# Patient Record
Sex: MALE | Race: BLACK OR AFRICAN AMERICAN | NOT HISPANIC OR LATINO | Employment: FULL TIME | URBAN - METROPOLITAN AREA
[De-identification: names, ages, dates, MRNs, and addresses within clinical notes are randomized per-mention and may not be internally consistent; named-entity substitution may affect disease eponyms.]

---

## 2017-08-17 ENCOUNTER — HOSPITAL ENCOUNTER (EMERGENCY)
Facility: OTHER | Age: 27
Discharge: HOME | End: 2017-08-17
Attending: INTERNAL MEDICINE
Payer: MEDICAID

## 2017-08-17 VITALS
DIASTOLIC BLOOD PRESSURE: 73 MMHG | TEMPERATURE: 99 F | BODY MASS INDEX: 28.04 KG/M2 | RESPIRATION RATE: 18 BRPM | HEIGHT: 68 IN | OXYGEN SATURATION: 100 % | WEIGHT: 185 LBS | SYSTOLIC BLOOD PRESSURE: 119 MMHG | HEART RATE: 53 BPM

## 2017-08-17 DIAGNOSIS — M54.50 ACUTE LEFT-SIDED LOW BACK PAIN WITHOUT SCIATICA: Primary | ICD-10-CM

## 2017-08-17 LAB
BILIRUBIN, POC UA: NEGATIVE
BLOOD, POC UA: NEGATIVE
CLARITY, POC UA: CLEAR
COLOR, POC UA: ABNORMAL
GLUCOSE, POC UA: NEGATIVE
KETONES, POC UA: NEGATIVE
LEUKOCYTE EST, POC UA: NEGATIVE
NITRITE, POC UA: NEGATIVE
PH UR STRIP: 6.5 [PH]
PROTEIN, POC UA: NEGATIVE
SPECIFIC GRAVITY, POC UA: 1.02
UROBILINOGEN, POC UA: 1 E.U./DL

## 2017-08-17 PROCEDURE — 81003 URINALYSIS AUTO W/O SCOPE: CPT

## 2017-08-17 PROCEDURE — 99283 EMERGENCY DEPT VISIT LOW MDM: CPT

## 2017-08-17 RX ORDER — METHOCARBAMOL 750 MG/1
1500 TABLET, FILM COATED ORAL 3 TIMES DAILY
Qty: 30 TABLET | Refills: 0 | Status: SHIPPED | OUTPATIENT
Start: 2017-08-17 | End: 2017-08-22

## 2017-08-17 RX ORDER — IBUPROFEN 800 MG/1
800 TABLET ORAL EVERY 8 HOURS PRN
Qty: 30 TABLET | Refills: 0 | Status: SHIPPED | OUTPATIENT
Start: 2017-08-17

## 2017-08-22 NOTE — ED PROVIDER NOTES
"Encounter Date: 8/17/2017       History     Chief Complaint   Patient presents with    Flank Pain     pt c/o L flank pain x " a few months" intermittedly, pt denies difficuly urinating      26 y/o male presents to ED with left flank pain x several days; no urinary symptoms      The history is provided by the patient. No  was used.   Back Pain    This is a new problem. The current episode started several days ago. The problem occurs intermittently. The problem has been unchanged. The pain is associated with no known injury. The pain is present in the lumbar spine. The quality of the pain is described as aching. The pain does not radiate. The pain is at a severity of 3/10. The symptoms are aggravated by bending and twisting. The pain is the same all the time. Pertinent negatives include no fever.     Review of patient's allergies indicates:  No Known Allergies  Past Medical History:   Diagnosis Date    Asthma      History reviewed. No pertinent surgical history.  No family history on file.  Social History   Substance Use Topics    Smoking status: Never Smoker    Smokeless tobacco: Never Used    Alcohol use Yes     Review of Systems   Constitutional: Negative for fever.   Musculoskeletal: Positive for back pain.   All other systems reviewed and are negative.      Physical Exam     Initial Vitals   BP Pulse Resp Temp SpO2   08/17/17 2013 08/17/17 2013 08/17/17 2013 08/17/17 2014 08/17/17 2013   (!) 156/85 (!) 58 18 99 °F (37.2 °C) 98 %      MAP       08/17/17 2013       108.67         Physical Exam    Nursing note and vitals reviewed.  Constitutional: He appears well-developed and well-nourished.   HENT:   Head: Normocephalic and atraumatic.   Eyes: EOM are normal.   Neck: Normal range of motion.   Cardiovascular: Normal rate and regular rhythm.   Pulmonary/Chest: Breath sounds normal. He is in respiratory distress.   Abdominal: Bowel sounds are normal. He exhibits no distension. There is no " tenderness.   Musculoskeletal:   Left flank pain upon movement; no CVA tenderness   Neurological: He is alert.   Skin: Skin is warm and dry.   Psychiatric: He has a normal mood and affect.         ED Course   Procedures  Labs Reviewed   POCT URINALYSIS W/O SCOPE - Abnormal; Notable for the following:        Result Value    Glucose, UA Negative (*)     Bilirubin, UA Negative (*)     Ketones, UA Negative (*)     Blood, UA Negative (*)     Protein, UA Negative (*)     Nitrite, UA Negative (*)     Leukocytes, UA Negative (*)     All other components within normal limits   POCT URINALYSIS W/O SCOPE             Medical Decision Making:   Initial Assessment:   28 y/o male presents to ED with left flank pain x several days; no urinary symptoms  Differential Diagnosis:   Back strain  Pyelonephritis    ED Management:  U/A wnl; pt given instructions for back pain and Rx's for ibuprofen and robaxin given and pt advised to f/u with PCP tomorrow for reevaluation                   ED Course     Clinical Impression:   The encounter diagnosis was Acute left-sided low back pain without sciatica.    Disposition:   Disposition: Discharged  Condition: Stable                        Herrera Jamison MD  08/22/17 6550

## 2018-05-23 ENCOUNTER — HOSPITAL ENCOUNTER (EMERGENCY)
Facility: HOSPITAL | Age: 28
Discharge: HOME | End: 2018-05-23
Attending: EMERGENCY MEDICINE
Payer: MEDICAID

## 2018-05-23 VITALS
DIASTOLIC BLOOD PRESSURE: 75 MMHG | WEIGHT: 205 LBS | RESPIRATION RATE: 15 BRPM | BODY MASS INDEX: 28.7 KG/M2 | HEIGHT: 71 IN | HEART RATE: 52 BPM | TEMPERATURE: 98 F | OXYGEN SATURATION: 99 % | SYSTOLIC BLOOD PRESSURE: 113 MMHG

## 2018-05-23 DIAGNOSIS — R07.9 RECURRENT CHEST PAIN: Primary | ICD-10-CM

## 2018-05-23 DIAGNOSIS — R07.9 CHEST PAIN: ICD-10-CM

## 2018-05-23 PROCEDURE — 93010 ELECTROCARDIOGRAM REPORT: CPT | Mod: ,,, | Performed by: INTERNAL MEDICINE

## 2018-05-23 PROCEDURE — 99284 EMERGENCY DEPT VISIT MOD MDM: CPT

## 2018-05-23 NOTE — ED PROVIDER NOTES
"Encounter Date: 5/23/2018       History     Chief Complaint   Patient presents with    Chest Pain     Pt reports of "stabbing" left sided CP x months without radiation. Pt denies SOB, N/V      28-year-old male with no past medical history presents to emergency department for 7 months history of intermittent, sharp, left-sided chest pain that is nonradiating.  Patient reports the last episode began around 9:00 p.m. last night while he was sitting at his computer doing research".  Symptoms lasted for approximately 2 min per patient after resolving spontaneously.  Currently asymptomatic.  He is here tonight due to the chronicity of symptoms and wants to be "checked out". Denies associated shortness of breath, fevers, nausea, and abdominal pain. No medications taken prior to arrival.  Denies calf pain and history of DVT/PE.  Nonsmoker.  No family history of heart disease.  There recent long-distance travel.          Review of patient's allergies indicates:  No Known Allergies  Past Medical History:   Diagnosis Date    Asthma      No past surgical history on file.  No family history on file.  Social History   Substance Use Topics    Smoking status: Never Smoker    Smokeless tobacco: Never Used    Alcohol use Yes     Review of Systems   Constitutional: Negative for chills and fever.   Eyes: Negative for visual disturbance.   Respiratory: Negative for cough and shortness of breath.    Cardiovascular: Positive for chest pain (resolved).   Gastrointestinal: Negative for abdominal pain, diarrhea, nausea and vomiting.   Genitourinary: Negative for dysuria, flank pain, frequency, hematuria and urgency.   Musculoskeletal: Negative for back pain and neck pain.   Skin: Negative for pallor, rash and wound.   Neurological: Negative for dizziness, seizures, syncope, light-headedness and headaches.       Physical Exam     Initial Vitals [05/23/18 0302]   BP Pulse Resp Temp SpO2   (!) 151/97 (!) 50 16 97.7 °F (36.5 °C) 100 %    "   MAP       115         Physical Exam    Nursing note and vitals reviewed.  Constitutional: He appears well-developed and well-nourished. He is not diaphoretic. No distress.   HENT:   Head: Normocephalic and atraumatic.   Nose: Nose normal.   Eyes: Conjunctivae and EOM are normal. Right eye exhibits no discharge. Left eye exhibits no discharge.   Neck: Normal range of motion. No tracheal deviation present. No JVD present.   Cardiovascular: Normal rate, regular rhythm and normal heart sounds. Exam reveals no friction rub.    No murmur heard.  Pulmonary/Chest: Breath sounds normal. No stridor. No respiratory distress. He has no wheezes. He has no rhonchi. He has no rales. He exhibits no tenderness.   Abdominal: Soft. He exhibits no distension. There is no tenderness. There is no guarding.   Musculoskeletal: Normal range of motion.   No chest wall tenderness   Neurological: He is alert and oriented to person, place, and time.   Skin: Skin is warm and dry. No rash and no abscess noted. No erythema. No pallor.         ED Course   Procedures  Labs Reviewed - No data to display  EKG Readings: (Independently Interpreted)   Initial Reading: No STEMI. Rhythm: Sinus Bradycardia. Heart Rate: 49. Axis: Normal.       X-Rays:   Independently Interpreted Readings:   Chest X-Ray: No acute abnormalities.     Medical Decision Making:   History:   Old Medical Records: I decided to obtain old medical records.      This is an emergent evaluation of a 28 y.o. male with no PMHx and no cardiac risk factors, presenting to the ED for non-radiating chest pain. Currently asymptomatic. Denies traumatic injury, nausea, vomiting, fever, cough, SOB, and symptoms worse with food. Vitals WNL, afebrile. Patient is non-toxic appearing, not diaphoretic, and in no acute distress. Lungs CTAB.     EKG shows no STEMI.    I am unsure of the etiology of this patient's chest pain, but do not believe it is of emergent etiology. No evidence of aortic dissection,  PTX, or PNA on CXR. No convincing evidence for ACS. PERC (-).     Discharged home. Instructed to follow up with PCP for reevaluation and management of symptoms. Issued an educational handout on chest pain.      I discussed with the patient the diagnosis, treatment plan, indications for return to the emergency department, and for expected follow-up. The patient verbalized an understanding. The patient is asked if there are any questions or concerns. We discuss the case, until all issues are addressed to the patients satisfaction. Patient understands and is agreeable to the plan.     I discussed this patient with Dr. Ceron who is in agreement with my assessment and plan.                     Clinical Impression:   The primary encounter diagnosis was Recurrent chest pain. A diagnosis of Chest pain was also pertinent to this visit.                           Noman Lyons PA-C  05/23/18 05

## 2018-05-30 DIAGNOSIS — R07.9 CHEST PAIN: Primary | ICD-10-CM

## 2018-06-04 ENCOUNTER — HOSPITAL ENCOUNTER (OUTPATIENT)
Dept: CARDIOLOGY | Facility: HOSPITAL | Age: 28
Discharge: HOME | End: 2018-06-04
Attending: INTERNAL MEDICINE
Payer: COMMERCIAL

## 2018-06-04 DIAGNOSIS — R07.9 CHEST PAIN: ICD-10-CM

## 2018-06-04 LAB — DIASTOLIC DYSFUNCTION: NO

## 2018-06-04 PROCEDURE — 93017 CV STRESS TEST TRACING ONLY: CPT

## 2018-06-04 PROCEDURE — 93016 CV STRESS TEST SUPVJ ONLY: CPT | Mod: ,,, | Performed by: INTERNAL MEDICINE

## 2018-06-04 PROCEDURE — 93018 CV STRESS TEST I&R ONLY: CPT | Mod: ,,, | Performed by: INTERNAL MEDICINE

## 2022-06-16 ENCOUNTER — CLINICAL SUPPORT (OUTPATIENT)
Dept: URGENT CARE | Facility: CLINIC | Age: 32
End: 2022-06-16
Payer: COMMERCIAL

## 2022-06-16 DIAGNOSIS — Z20.822 ENCOUNTER FOR LABORATORY TESTING FOR COVID-19 VIRUS: Primary | ICD-10-CM

## 2024-01-07 ENCOUNTER — HOSPITAL ENCOUNTER (EMERGENCY)
Facility: HOSPITAL | Age: 34
Discharge: HOME | End: 2024-01-07
Attending: EMERGENCY MEDICINE
Payer: MEDICAID

## 2024-01-07 VITALS
BODY MASS INDEX: 30.35 KG/M2 | DIASTOLIC BLOOD PRESSURE: 90 MMHG | RESPIRATION RATE: 16 BRPM | TEMPERATURE: 98 F | SYSTOLIC BLOOD PRESSURE: 132 MMHG | OXYGEN SATURATION: 100 % | HEIGHT: 70 IN | HEART RATE: 53 BPM | WEIGHT: 212 LBS

## 2024-01-07 DIAGNOSIS — M54.2 NECK PAIN: Primary | ICD-10-CM

## 2024-01-07 PROBLEM — J45.20 MILD INTERMITTENT ASTHMA WITHOUT COMPLICATION: Status: ACTIVE | Noted: 2022-07-01

## 2024-01-07 PROCEDURE — 99284 EMERGENCY DEPT VISIT MOD MDM: CPT

## 2024-01-07 RX ORDER — METHOCARBAMOL 750 MG/1
1500 TABLET, FILM COATED ORAL 3 TIMES DAILY PRN
Qty: 20 TABLET | Refills: 0 | Status: SHIPPED | OUTPATIENT
Start: 2024-01-07 | End: 2024-01-12

## 2024-01-07 RX ORDER — KETOROLAC TROMETHAMINE 10 MG/1
10 TABLET, FILM COATED ORAL 4 TIMES DAILY PRN
Qty: 20 TABLET | Refills: 0 | Status: SHIPPED | OUTPATIENT
Start: 2024-01-07 | End: 2024-01-12

## 2024-01-07 RX ORDER — PANTOPRAZOLE SODIUM 20 MG/1
20 TABLET, DELAYED RELEASE ORAL DAILY
Qty: 10 TABLET | Refills: 0 | Status: SHIPPED | OUTPATIENT
Start: 2024-01-07 | End: 2024-01-17

## 2024-01-07 NOTE — Clinical Note
"Daxa Reid" Luis Eduardo was seen and treated in our emergency department on 1/7/2024.  He may return to work on 01/10/2024.       If you have any questions or concerns, please don't hesitate to call.      Jorge Truong, CHELITA"

## 2024-01-08 NOTE — ED PROVIDER NOTES
Encounter Date: 1/7/2024       History     Chief Complaint   Patient presents with    Neck Pain     R side neck pain x 3 days. No fever, injury or trauma. Full ROM noted.      35yo M presents to ED with chief complaint atraumatic R sided neck pain x 7d.    Pt initially with pain to R sided posterolateral neck, has begun to radiate and involve his R upper back, his R deltoid region of his shoulder. No hx similar pain. No cough. No fever. No recent illness. No SOB or CP. No pleuritic pain. Pain worsened with ROM of his neck, with ROM of his R shoulder. No joint swelling. No rash. No joint redness or warmth. No wound or injury. No associated HA. No relief with Aspirin.     R hand dominant.     PMH:  Asthma      Review of patient's allergies indicates:  No Known Allergies  Past Medical History:   Diagnosis Date    Asthma      No past surgical history on file.  No family history on file.  Social History     Tobacco Use    Smoking status: Never    Smokeless tobacco: Never   Substance Use Topics    Alcohol use: Yes    Drug use: No     Review of Systems   Constitutional:  Negative for chills and fever.   Respiratory:  Negative for cough and shortness of breath.    Cardiovascular:  Negative for chest pain.   Musculoskeletal:  Positive for arthralgias, back pain and neck pain. Negative for neck stiffness.   Skin:  Negative for rash and wound.   Neurological:  Negative for syncope and headaches.   Hematological:  Negative for adenopathy.       Physical Exam     Initial Vitals [01/07/24 1809]   BP Pulse Resp Temp SpO2   134/82 (!) 54 16 97.4 °F (36.3 °C) 99 %      MAP       --         Physical Exam    Nursing note and vitals reviewed.  Constitutional: He appears well-developed and well-nourished. He is not diaphoretic. No distress.   HENT:   Head: Normocephalic and atraumatic.   Neck: Neck supple.   Normal range of motion.  Cardiovascular:  Intact distal pulses.           1+ radial bilaterally   Pulmonary/Chest: No respiratory  distress.   Musculoskeletal:         General: Normal range of motion.      Cervical back: Normal range of motion and neck supple.      Comments: No significant muscular tenderness to palpation to the cervical paraspinal musculature, to the trapezius, to the upper back, to the deltoid musculature.  There is pain to these regions with rotation of neck to the right, with abduction of right shoulder past 90° anteriorly.  No significant pain with passive range of motion of the right shoulder.No bony deformity.  No open wound.       Neurological: He is alert and oriented to person, place, and time.   Skin: Skin is warm. Capillary refill takes less than 2 seconds.   Psychiatric: He has a normal mood and affect. Thought content normal.         ED Course   Procedures  Labs Reviewed - No data to display       Imaging Results    None          Medications - No data to display  Medical Decision Making  Differential diagnosis:  Cervical strain, back strain, contusion, fracture, arthritis    Amount and/or Complexity of Data Reviewed  Discussion of management or test interpretation with external provider(s): Young and otherwise healthy with no significant comorbidities.  Reassuring vitals.  Atraumatic neck pain, eventually began to involve the upper back, right shoulder.  Painful range of motion of the right upper extremity, range of motion of the neck.  No nuchal rigidity.  No fever.  No associated headache.  There is no meningismus.  There is no muscular tenderness on exam.  No midline spinal tenderness.  Will trial muscle relaxers, NSAIDs, outpatient follow-up for any persistent symptoms.  Discussed interim return precautions.  Patient feels comfortable with current plan, with attempted outpatient treatment.     Risk  Prescription drug management.                                      Clinical Impression:  Final diagnoses:  [M54.2] Neck pain (Primary)          ED Disposition Condition    Discharge Stable          ED Prescriptions        Medication Sig Dispense Start Date End Date Auth. Provider    methocarbamoL (ROBAXIN) 750 MG Tab Take 2 tablets (1,500 mg total) by mouth 3 (three) times daily as needed (stiffness/soreness). 20 tablet 1/7/2024 1/12/2024 Jorge Truong PA-C    ketorolac (TORADOL) 10 mg tablet Take 1 tablet (10 mg total) by mouth 4 (four) times daily as needed for Pain. 20 tablet 1/7/2024 1/12/2024 Jorge Truong PA-C    pantoprazole (PROTONIX) 20 MG tablet Take 1 tablet (20 mg total) by mouth once daily. for 10 days 10 tablet 1/7/2024 1/17/2024 Jorge Truong PA-C          Follow-up Information       Follow up With Specialties Details Why Contact Info    Loly Baker FNP  Schedule an appointment as soon as possible for a visit  For reevaluation [address] [address]   [phone] (Work)   [phone] (Fax)    [facility] -  Schedule an appointment as soon as possible for a visit  To establish primary care physician, for reevaluation [address] [place] [address]  [phone]      [facility] Schedule an appointment as soon as possible for a visit  To establish primary care physician, For reevaluation [address] [facility] [place] [address]  [phone]               Jorge Truong PA-C  01/08/24 9035

## 2024-01-08 NOTE — DISCHARGE INSTRUCTIONS
Get some good rest. Ice, heating pad to the area may help with stiffness and soreness.    Toradol 3-4 times daily as needed for pain.  Remember to take Protonix once daily while taking the Toradol to prevent stomach injury.  Robaxin for continued stiffness/soreness. Be aware, this medication is sedating.  Do not mix with alcohol or any other sedating medications.  Do not drive or operate machinery when taking this medication.     Follow-up and establish care with a local primary care provider using information provided for re-evaluation should your symptoms persist.    Return to this ED if worsening pain, if you develop arm weakness, if you begin with severe headache, if unable to tolerate pain, if any other problems occur.

## 2024-01-08 NOTE — ED NOTES
Patient examined, evaluated, and educated on discharge instructions and prescriptions by SAVI Patel without nursing assistance. [facility]

## 2024-07-25 ENCOUNTER — HOSPITAL ENCOUNTER (EMERGENCY)
Facility: HOSPITAL | Age: 34
Discharge: HOME | End: 2024-07-25
Attending: EMERGENCY MEDICINE
Payer: COMMERCIAL

## 2024-07-25 VITALS
TEMPERATURE: 98 F | OXYGEN SATURATION: 99 % | WEIGHT: 210 LBS | DIASTOLIC BLOOD PRESSURE: 80 MMHG | HEART RATE: 73 BPM | HEIGHT: 70 IN | SYSTOLIC BLOOD PRESSURE: 131 MMHG | RESPIRATION RATE: 20 BRPM | BODY MASS INDEX: 30.06 KG/M2

## 2024-07-25 DIAGNOSIS — M79.673 FOOT PAIN: ICD-10-CM

## 2024-07-25 DIAGNOSIS — S90.30XA CONTUSION OF FOOT, UNSPECIFIED LATERALITY, INITIAL ENCOUNTER: Primary | ICD-10-CM

## 2024-07-25 PROCEDURE — 99284 EMERGENCY DEPT VISIT MOD MDM: CPT | Mod: 25

## 2024-07-25 PROCEDURE — 25000003 PHARM REV CODE 250

## 2024-07-25 RX ORDER — METHOCARBAMOL 500 MG/1
1000 TABLET, FILM COATED ORAL 3 TIMES DAILY
Qty: 30 TABLET | Refills: 0 | Status: SHIPPED | OUTPATIENT
Start: 2024-07-25 | End: 2024-07-30

## 2024-07-25 RX ORDER — KETOROLAC TROMETHAMINE 10 MG/1
10 TABLET, FILM COATED ORAL
Status: COMPLETED | OUTPATIENT
Start: 2024-07-25 | End: 2024-07-25

## 2024-07-25 RX ORDER — NAPROXEN 500 MG/1
500 TABLET ORAL 2 TIMES DAILY
Qty: 30 TABLET | Refills: 0 | Status: SHIPPED | OUTPATIENT
Start: 2024-07-25

## 2024-07-25 RX ORDER — METHOCARBAMOL 500 MG/1
1000 TABLET, FILM COATED ORAL 3 TIMES DAILY
Qty: 30 TABLET | Refills: 0 | Status: SHIPPED | OUTPATIENT
Start: 2024-07-25 | End: 2024-07-25

## 2024-07-25 RX ORDER — NAPROXEN 500 MG/1
500 TABLET ORAL 2 TIMES DAILY
Qty: 30 TABLET | Refills: 0 | Status: SHIPPED | OUTPATIENT
Start: 2024-07-25 | End: 2024-07-25

## 2024-07-25 RX ADMIN — KETOROLAC TROMETHAMINE 10 MG: 10 TABLET, FILM COATED ORAL at 07:07

## 2024-07-25 NOTE — Clinical Note
"Daxa Hoff (Cary) was seen and treated in our emergency department on 7/25/2024.  He may return to work on 07/27/2024.       If you have any questions or concerns, please don't hesitate to call.       RN    "

## 2024-07-26 NOTE — DISCHARGE INSTRUCTIONS
Thank you for coming to our Emergency Department today. It is important to remember that some problems or medical conditions are difficult to diagnose and may not be found or addressed during your Emergency Department visit.  These conditions often start with non-specific symptoms and can only be diagnosed on follow up visits with your primary care physician or specialist when the symptoms continue or change. Please remember that all medical conditions can change, and we cannot predict how you will be feeling tomorrow or the next day. Return to the ER with any questions/concerns, new/concerning symptoms including fever, chest pain, shortness of breath, loss of consciousness, dizziness, weakness, worsening symptoms, failure to improve, or any other concerns. Also, please follow up with your Primary Care Physician and/or Pediatrician in the next 1-2 days to review your ED visit in entirety and for re-evaluation.   Be sure to follow up with your primary care doctor and review all labs/imaging/tests that were performed during your ER visit with them. It is very common for us to identify non-emergent incidental findings which must be followed up with your primary care physician.  Some labs/imaging/tests may be outside of the normal range, and require non-emergent follow-up and/or further investigation/treatment/procedures/testing to help diagnose/exclude/prevent complications or other potentially serious medical conditions. Some abnormalities may not have been discussed or addressed during your ER visit. Some lab results may not return during your ER visit but can be accessible by downloading the free [facility] My[facility] [email or web address] or by visiting [email or web address] . It is important for you to review all labs/imaging/tests which are outside of the normal range with your physician.  An ER visit does not replace a primary care visit, and many screening tests or follow-up tests cannot be ordered by an ER doctor or performed by  the ER. Some tests may even require pre-approval.  If you do not have a primary care doctor, you may contact the one listed on your discharge paperwork or you may also call the [facility] Appointment Desk at [phone] , or [address] [place] [address] at  [phone] to schedule an appointment, or establish care with a primary care doctor or even a specialist and to obtain information about local resources. It is important to your health that you have a primary care doctor.  Please take all medications as directed. We have done our best to select a medication for you that will treat your condition however, all medications may potentially have side-effects and it is impossible to predict which medications may give you side-effects or what those side-effects (if any) those medications may give you.  If you feel that you are having a negative effect or side-effect of any medication you should stop taking those medications immediately and seek medical attention. If you feel that you are having a life-threatening reaction call 911.  Do not drive, swim, climb to height, take a bath, operate heavy machinery, drink alcohol or take potentially sedating medications, sign any legal documents or make any important decisions for 24 hours if you have received any pain medications, sedatives or mood altering drugs during your ER visit or within 24 hours of taking them if they have been prescribed to you.   You can find additional resources for Dentists, hearing aids, durable medical equipment, low cost pharmacies and other resources at [email or web address]  Patient agrees with this plan. Discussed with her strict return precautions, they verbalized understanding. Patient is stable for discharge.   § Please take all medication as prescribed.